# Patient Record
Sex: MALE | Race: WHITE | NOT HISPANIC OR LATINO | Employment: FULL TIME | URBAN - METROPOLITAN AREA
[De-identification: names, ages, dates, MRNs, and addresses within clinical notes are randomized per-mention and may not be internally consistent; named-entity substitution may affect disease eponyms.]

---

## 2019-07-08 NOTE — PROGRESS NOTES
Subjective:       Patient ID: Jaret Triana Jr. is a 29 y.o. male with no significant PMH who presents today to establish care.  He brews beer for a living.    Chief Complaint: Establish Care; GI Problem (nausea every 6-8 mos); and Shoulder Pain (LT side mild pain; clicking)    HPI   Patient with complaints of recurrent nausea every 6-8 months for the past 4-5 years.  Some abdominal pain in epigastrium and emesis.  Always after a meal.  Went to Urgent Care in the past.  Unclear of food association - possibly fatty.  Drank heavily in the past.  He quit smoking a couple a months ago and no drinking for a month.  He had an episode this past weekend.  He just got health insurance after a long.     Having left shoulder pain, but mild.  Feels a clicking in the shoulder.  No paresthesia.    Patient denies f/c, n/v/d.  No chest pain or SOB.  No dysuria.  No headaches or change in vision.  No dizziness.  No significant  weight gain or weight loss.  Remaining ROS negative.    Review of Systems   Constitutional: Negative for appetite change, diaphoresis, fatigue and unexpected weight change.   HENT: Negative for congestion, ear pain, hearing loss, rhinorrhea, sinus pressure, sore throat, trouble swallowing and voice change.    Eyes: Negative for photophobia, pain and visual disturbance.   Respiratory: Negative for cough, chest tightness, shortness of breath and wheezing.    Cardiovascular: Negative for chest pain, palpitations and leg swelling.   Gastrointestinal: Positive for abdominal pain, nausea and vomiting. Negative for blood in stool, constipation and diarrhea.   Endocrine: Negative for cold intolerance, heat intolerance, polydipsia, polyphagia and polyuria.   Genitourinary: Negative for decreased urine volume, difficulty urinating, discharge, dysuria, flank pain, hematuria, scrotal swelling, testicular pain and urgency.   Musculoskeletal: Positive for arthralgias (left shoulder). Negative for back pain, myalgias and  neck pain.   Skin: Negative for rash.   Neurological: Negative for dizziness, tremors, syncope, weakness, numbness and headaches.   Hematological: Does not bruise/bleed easily.   Psychiatric/Behavioral: Negative for agitation, confusion and sleep disturbance. The patient is not nervous/anxious.        Objective:      Physical Exam   Constitutional: He is oriented to person, place, and time. He appears well-developed and well-nourished.   HENT:   Head: Normocephalic.   Nose: Nose normal.   Mouth/Throat: Oropharynx is clear and moist.   Eyes: Pupils are equal, round, and reactive to light. Conjunctivae and EOM are normal. Right eye exhibits no discharge. Left eye exhibits no discharge. No scleral icterus.   Neck: Normal range of motion. Neck supple. No thyromegaly present.   Cardiovascular: Normal rate, regular rhythm, normal heart sounds and intact distal pulses. Exam reveals no gallop and no friction rub.   No murmur heard.  Pulmonary/Chest: Effort normal and breath sounds normal. No respiratory distress. He has no wheezes. He has no rales.   Abdominal: Soft. Bowel sounds are normal. He exhibits no distension. There is tenderness (mild epigastric tenderness). There is no rebound and no guarding.   Musculoskeletal: Normal range of motion. He exhibits no edema, tenderness or deformity.   Lymphadenopathy:     He has no cervical adenopathy.   Neurological: He is alert and oriented to person, place, and time. No cranial nerve deficit or sensory deficit.   Skin: Skin is warm and dry. No rash noted. No erythema.   Psychiatric: He has a normal mood and affect. His behavior is normal. Thought content normal.       Assessment:       1. Annual physical exam    2. Screen for STD (sexually transmitted disease)    3. Nausea in adult    4. Chronic left shoulder pain    5. Epigastric pain    6. Need for Tdap vaccination        Plan:   -Today's Visit - patient is awake and alert.    -GI - Patient with complaints of recurrent nausea  every 6-8 months for the past 4-5 years.  Some abdominal pain in epigastrium and emesis.  Always after a meal.  Went to Urgent Care in the past.  Unclear of food association - possibly fatty.  Drank heavily in the past.  He quit smoking a couple a months ago and no drinking for a month.  He had an episode this past weekend.  He just got health insurance after a long time.    Will check CMP, CBC, Amylase, Lipase.  Will check Abdominal US.  Will check Stool H pylori.  Will give a trial of Protonix 20mg once a day.    -MSK - Left Shoulder Pain and Clicking - Having left shoulder pain, but mild.  Feels a clicking in the shoulder.  No paresthesia.  For past 5 years.  Will refer to Ortho.    - -  We discussed STD screening - will do today.    -HCM - Discussed Flu and Tdap (today) Vaccines.      -Follow Up - 1 month

## 2019-07-09 ENCOUNTER — OFFICE VISIT (OUTPATIENT)
Dept: PRIMARY CARE CLINIC | Facility: CLINIC | Age: 29
End: 2019-07-09
Payer: COMMERCIAL

## 2019-07-09 VITALS
WEIGHT: 143.06 LBS | HEART RATE: 81 BPM | HEIGHT: 68 IN | SYSTOLIC BLOOD PRESSURE: 110 MMHG | DIASTOLIC BLOOD PRESSURE: 60 MMHG | BODY MASS INDEX: 21.68 KG/M2

## 2019-07-09 DIAGNOSIS — Z11.3 SCREEN FOR STD (SEXUALLY TRANSMITTED DISEASE): ICD-10-CM

## 2019-07-09 DIAGNOSIS — R11.0 NAUSEA IN ADULT: ICD-10-CM

## 2019-07-09 DIAGNOSIS — Z00.00 ANNUAL PHYSICAL EXAM: Primary | ICD-10-CM

## 2019-07-09 DIAGNOSIS — R10.13 EPIGASTRIC PAIN: ICD-10-CM

## 2019-07-09 DIAGNOSIS — M25.512 CHRONIC LEFT SHOULDER PAIN: ICD-10-CM

## 2019-07-09 DIAGNOSIS — G89.29 CHRONIC LEFT SHOULDER PAIN: ICD-10-CM

## 2019-07-09 DIAGNOSIS — Z23 NEED FOR TDAP VACCINATION: ICD-10-CM

## 2019-07-09 PROCEDURE — 3008F BODY MASS INDEX DOCD: CPT | Mod: CPTII,S$GLB,, | Performed by: INTERNAL MEDICINE

## 2019-07-09 PROCEDURE — 99999 PR PBB SHADOW E&M-NEW PATIENT-LVL IV: ICD-10-PCS | Mod: PBBFAC,,, | Performed by: INTERNAL MEDICINE

## 2019-07-09 PROCEDURE — 99203 PR OFFICE/OUTPT VISIT, NEW, LEVL III, 30-44 MIN: ICD-10-PCS | Mod: 25,S$GLB,, | Performed by: INTERNAL MEDICINE

## 2019-07-09 PROCEDURE — 99999 PR PBB SHADOW E&M-NEW PATIENT-LVL IV: CPT | Mod: PBBFAC,,, | Performed by: INTERNAL MEDICINE

## 2019-07-09 PROCEDURE — 90471 TDAP VACCINE GREATER THAN OR EQUAL TO 7YO IM: ICD-10-PCS | Mod: S$GLB,,, | Performed by: INTERNAL MEDICINE

## 2019-07-09 PROCEDURE — 99203 OFFICE O/P NEW LOW 30 MIN: CPT | Mod: 25,S$GLB,, | Performed by: INTERNAL MEDICINE

## 2019-07-09 PROCEDURE — 3008F PR BODY MASS INDEX (BMI) DOCUMENTED: ICD-10-PCS | Mod: CPTII,S$GLB,, | Performed by: INTERNAL MEDICINE

## 2019-07-09 PROCEDURE — 90715 TDAP VACCINE GREATER THAN OR EQUAL TO 7YO IM: ICD-10-PCS | Mod: S$GLB,,, | Performed by: INTERNAL MEDICINE

## 2019-07-09 PROCEDURE — 90471 IMMUNIZATION ADMIN: CPT | Mod: S$GLB,,, | Performed by: INTERNAL MEDICINE

## 2019-07-09 PROCEDURE — 90715 TDAP VACCINE 7 YRS/> IM: CPT | Mod: S$GLB,,, | Performed by: INTERNAL MEDICINE

## 2019-07-09 RX ORDER — PANTOPRAZOLE SODIUM 20 MG/1
20 TABLET, DELAYED RELEASE ORAL DAILY
Qty: 30 TABLET | Refills: 3 | Status: SHIPPED | OUTPATIENT
Start: 2019-07-09 | End: 2020-07-08

## 2019-07-09 NOTE — LETTER
July 9, 2019      [facility]  [address] [place] [address]  Phone: [phone]  Fax: [phone]       Patient: Jaret Triana   YOB: 1990  Date of Visit: 07/09/2019    To Whom It May Concern:    Raine Triana  was at [facility] on 07/09/2019. He may return to work on 07/10/19 with no restrictions. If you have any questions or concerns, or if I can be of further assistance, please do not hesitate to contact me.    Sincerely,    Dr. Woodard

## 2019-07-09 NOTE — LETTER
July 9, 2019      [facility]  [address] [facility] [address]  Phone: [phone]  Fax: [phone]       Patient: Jaret Triana   YOB: 1990  Date of Visit: 07/09/2019    To Whom It May Concern:    Raine Triana  was at [facility] on 07/09/2019. He may return to work on 07/09/19 with no restrictions. If you have any questions or concerns, or if I can be of further assistance, please do not hesitate to contact me.    Sincerely,    Dr. Woodard

## 2019-07-09 NOTE — PROGRESS NOTES
"Patient was given vaccine information sheet for the Tdap immunization. The area of injection was palpated using the acromion process as a landmark. This area was cleaned with alcohol. Using a 25g 1" safety needle, 0.5mL of the vaccine was placed into the right deltoid muscle. The injection site was dressed with a bandage. Patient experienced no complications and was discharged in stable condition. Tdap Lot: [id number] Exp: 03/14/2021    "

## 2019-07-10 ENCOUNTER — TELEPHONE (OUTPATIENT)
Dept: PRIMARY CARE CLINIC | Facility: CLINIC | Age: 29
End: 2019-07-10

## 2019-07-12 ENCOUNTER — HOSPITAL ENCOUNTER (OUTPATIENT)
Dept: RADIOLOGY | Facility: OTHER | Age: 29
Discharge: HOME | End: 2019-07-12
Attending: INTERNAL MEDICINE
Payer: COMMERCIAL

## 2019-07-12 ENCOUNTER — PATIENT MESSAGE (OUTPATIENT)
Dept: PRIMARY CARE CLINIC | Facility: CLINIC | Age: 29
End: 2019-07-12

## 2019-07-12 DIAGNOSIS — R11.0 NAUSEA IN ADULT: ICD-10-CM

## 2019-07-12 DIAGNOSIS — Z00.00 ANNUAL PHYSICAL EXAM: ICD-10-CM

## 2019-07-12 DIAGNOSIS — G89.29 CHRONIC LEFT SHOULDER PAIN: ICD-10-CM

## 2019-07-12 DIAGNOSIS — R10.13 EPIGASTRIC PAIN: ICD-10-CM

## 2019-07-12 DIAGNOSIS — M25.512 CHRONIC LEFT SHOULDER PAIN: ICD-10-CM

## 2019-07-12 DIAGNOSIS — Z11.3 SCREEN FOR STD (SEXUALLY TRANSMITTED DISEASE): ICD-10-CM

## 2019-07-12 PROCEDURE — 76700 US EXAM ABDOM COMPLETE: CPT | Mod: 26,,, | Performed by: RADIOLOGY

## 2019-07-12 PROCEDURE — 76700 US EXAM ABDOM COMPLETE: CPT | Mod: TC

## 2019-07-12 PROCEDURE — 76700 US ABDOMEN COMPLETE: ICD-10-PCS | Mod: 26,,, | Performed by: RADIOLOGY

## 2019-07-17 ENCOUNTER — PATIENT MESSAGE (OUTPATIENT)
Dept: PRIMARY CARE CLINIC | Facility: CLINIC | Age: 29
End: 2019-07-17

## 2019-07-21 ENCOUNTER — PATIENT MESSAGE (OUTPATIENT)
Dept: PRIMARY CARE CLINIC | Facility: CLINIC | Age: 29
End: 2019-07-21

## 2019-07-26 ENCOUNTER — TELEPHONE (OUTPATIENT)
Dept: ORTHOPEDICS | Facility: CLINIC | Age: 29
End: 2019-07-26

## 2019-07-26 DIAGNOSIS — R52 PAIN: Primary | ICD-10-CM

## 2019-07-30 ENCOUNTER — OFFICE VISIT (OUTPATIENT)
Dept: ORTHOPEDICS | Facility: CLINIC | Age: 29
End: 2019-07-30
Payer: COMMERCIAL

## 2019-07-30 ENCOUNTER — HOSPITAL ENCOUNTER (OUTPATIENT)
Dept: RADIOLOGY | Facility: OTHER | Age: 29
Discharge: HOME | End: 2019-07-30
Attending: ORTHOPAEDIC SURGERY
Payer: COMMERCIAL

## 2019-07-30 VITALS
BODY MASS INDEX: 21.76 KG/M2 | HEART RATE: 88 BPM | HEIGHT: 68 IN | SYSTOLIC BLOOD PRESSURE: 96 MMHG | DIASTOLIC BLOOD PRESSURE: 66 MMHG

## 2019-07-30 DIAGNOSIS — M75.52 BURSITIS OF LEFT SHOULDER: Primary | ICD-10-CM

## 2019-07-30 DIAGNOSIS — R52 PAIN: ICD-10-CM

## 2019-07-30 PROCEDURE — 99999 PR PBB SHADOW E&M-EST. PATIENT-LVL II: ICD-10-PCS | Mod: PBBFAC,,, | Performed by: ORTHOPAEDIC SURGERY

## 2019-07-30 PROCEDURE — 73030 X-RAY EXAM OF SHOULDER: CPT | Mod: TC,FY,LT

## 2019-07-30 PROCEDURE — 20610 LARGE JOINT ASPIRATION/INJECTION: L SUBACROMIAL BURSA: ICD-10-PCS | Mod: LT,S$GLB,, | Performed by: ORTHOPAEDIC SURGERY

## 2019-07-30 PROCEDURE — 99204 OFFICE O/P NEW MOD 45 MIN: CPT | Mod: 25,S$GLB,, | Performed by: ORTHOPAEDIC SURGERY

## 2019-07-30 PROCEDURE — 20610 DRAIN/INJ JOINT/BURSA W/O US: CPT | Mod: LT,S$GLB,, | Performed by: ORTHOPAEDIC SURGERY

## 2019-07-30 PROCEDURE — 3008F BODY MASS INDEX DOCD: CPT | Mod: CPTII,S$GLB,, | Performed by: ORTHOPAEDIC SURGERY

## 2019-07-30 PROCEDURE — 3008F PR BODY MASS INDEX (BMI) DOCUMENTED: ICD-10-PCS | Mod: CPTII,S$GLB,, | Performed by: ORTHOPAEDIC SURGERY

## 2019-07-30 PROCEDURE — 99204 PR OFFICE/OUTPT VISIT, NEW, LEVL IV, 45-59 MIN: ICD-10-PCS | Mod: 25,S$GLB,, | Performed by: ORTHOPAEDIC SURGERY

## 2019-07-30 PROCEDURE — 99999 PR PBB SHADOW E&M-EST. PATIENT-LVL II: CPT | Mod: PBBFAC,,, | Performed by: ORTHOPAEDIC SURGERY

## 2019-07-30 PROCEDURE — 73030 XR SHOULDER COMPLETE 2 OR MORE VIEWS LEFT: ICD-10-PCS | Mod: 26,LT,, | Performed by: RADIOLOGY

## 2019-07-30 PROCEDURE — 73030 X-RAY EXAM OF SHOULDER: CPT | Mod: 26,LT,, | Performed by: RADIOLOGY

## 2019-07-30 NOTE — LETTER
July 31, 2019      Taco Woodard MD  [id number]   [address]  [facility] [address]           [facility] [place] [address] [facility] [address]  [address], [address]  [facility] [address]  Phone: [phone]          Patient: Jaret Triana Jr.   MR Number: [id number]   YOB: 1990   Date of Visit: 7/30/2019       Dear Dr. Taco Woodard:    Thank you for referring Jaret Triana to me for evaluation. Attached you will find relevant portions of my assessment and plan of care.    If you have questions, please do not hesitate to call me. I look forward to following Jaret Triana along with you.    Sincerely,    Genoveva Zavala MD    Enclosure  CC:  No Recipients    If you would like to receive this communication electronically, please contact [email or web address] or [phone] to request more information on [employer] Link access.    For providers and/or their staff who would like to refer a patient to [facility], please contact us through our one-stop-shop provider referral line, [facility], at [phone].    If you feel you have received this communication in error or would no longer like to receive these types of communications, please e-mail [email or web address]

## 2019-07-30 NOTE — PROGRESS NOTES
"Subjective:      Patient ID: Jaret Triana Jr. is a 29 y.o. male.    Chief Complaint: left shoulder pain     HPI  Jaret Triana Jr. is a  29 y.o. male presenting today for left shoulder pain. He notes onset years ago. He does not recall injury. He does exercise and work at a [employer]. He has increased pain with use. Pain is mild. It occasionally bothers him at night. He notes occasional "clicking" in the shoulder. He has not tried anything for this. He denies numbness/tingling.       Review of patient's allergies indicates:  No Known Allergies      Current Outpatient Medications   Medication Sig Dispense Refill    pantoprazole (PROTONIX) 20 MG tablet Take 1 tablet (20 mg total) by mouth once daily. 30 tablet 3     No current facility-administered medications for this visit.        History reviewed. No pertinent past medical history.    Past Surgical History:   Procedure Laterality Date    CLOSED REDUCTION HAND FRACTURE      right hand - 2009    HAND SURGERY Right        Review of Systems:  Constitutional: Negative for chills and fever.   Respiratory: Negative for cough and shortness of breath.    Gastrointestinal: Negative for nausea and vomiting.   Skin: Negative for rash.   Neurological: Negative for dizziness and headaches.   Psychiatric/Behavioral: Negative for depression.   MSK as in HPI       OBJECTIVE:     PHYSICAL EXAM:  BP 96/66   Pulse 88   Ht 5' 8" (1.727 m)   BMI 21.76 kg/m²     GEN:  NAD, well-developed, well-groomed.  NEURO: Awake, alert, and oriented. Normal attention and concentration.    PSYCH: Normal mood and affect. Behavior is normal.  HEENT: No cervical lymphadenopathy noted.  CARDIOVASCULAR: Radial pulses 2+ bilaterally. No LE edema noted.  PULMONARY: Breath sounds normal. No respiratory distress.  SKIN: Intact, no rashes.      MSK:   LUE:  Good active ROM of the wrist and fingers. He has good motion of the shoulder. no significant ttp. AIN/PIN/Radial/Median/Ulnar Nerves assessed in " isolation without deficit. Radial & Ulnar arteries palpated 2+. Capillary Refill <3s.      RADIOGRAPHS:  Xray left shoulder pain 7/30/19  FINDINGS:  The skeletal structures are intact with good alignment.  The glenohumeral joint space is not well profiled, could be narrowed.  AC joint is unremarkable.  No erosion or abnormal soft tissue calcification is seen.  Comments: I have personally reviewed the imaging and I agree with the above radiologist's report.    ASSESSMENT/PLAN:       ICD-10-CM ICD-9-CM   1. Bursitis of left shoulder M75.52 726.10       Orders Placed This Encounter    Ambulatory Referral to Physical/Occupational Therapy     Plan:   -Treatment options discussed. He wishes to proceed with therapy and a left shoulder injection today.   -RTC 6 wks       I have explained the risks, benefits, and alternatives of the procedure in detail.  The patient voices understanding and all questions have been answered.  The patient agrees to proceed as planned. After a sterile prep of the skin in the normal the left shoulder is injected from the posterior approach using a 22 gauge needle with a combination of 8cc 1% lidocaine and 80 mg of kenalog. The patient is cautioned and immediate relief of pain is secondary to the local anesthetic and will be temporary.  After the anesthetic wears off there may be a increase in pain that may last for a few hours or a few days and they should use ice to help alleviate this flair up of pain.       The patient indicates understanding of these issues and agrees to the plan.    Heather Norris PA-C  [facility]   [facility]  [place], [place]

## 2019-07-31 RX ORDER — TRIAMCINOLONE ACETONIDE 40 MG/ML
80 INJECTION, SUSPENSION INTRA-ARTICULAR; INTRAMUSCULAR
Status: DISCONTINUED | OUTPATIENT
Start: 2019-07-31 | End: 2019-07-31 | Stop reason: HOSPADM

## 2019-07-31 RX ADMIN — TRIAMCINOLONE ACETONIDE 80 MG: 40 INJECTION, SUSPENSION INTRA-ARTICULAR; INTRAMUSCULAR at 09:07

## 2019-07-31 NOTE — PROGRESS NOTES
I have personally taken the history and examined the patient. I agree with the Hand Surgery PA's note. The plan will be shoulder injection, PT. Pt has TTP over biceps. Pain with ROM, ROM is full.

## 2019-07-31 NOTE — PROCEDURES
Large Joint Aspiration/Injection: L subacromial bursa  Date/Time: 7/31/2019 9:39 AM  Performed by: Genoveva Zavala MD  Authorized by: Genoveva Zavala MD     Consent Done?:  Yes (Verbal)  Indications:  Pain  Timeout: Prior to procedure the correct patient, procedure, and site was verified      Location:  Shoulder  Site:  L subacromial bursa  Prep: Patient was prepped and draped in usual sterile fashion    Needle size:  22 G  Approach:  Posterior  Medications:  80 mg triamcinolone acetonide 40 mg/mL

## 2019-08-03 ENCOUNTER — PATIENT MESSAGE (OUTPATIENT)
Dept: ORTHOPEDICS | Facility: CLINIC | Age: 29
End: 2019-08-03

## 2019-08-12 ENCOUNTER — CLINICAL SUPPORT (OUTPATIENT)
Dept: REHABILITATION | Facility: OTHER | Age: 29
End: 2019-08-12
Payer: COMMERCIAL

## 2019-08-12 DIAGNOSIS — G89.29 CHRONIC LEFT SHOULDER PAIN: ICD-10-CM

## 2019-08-12 DIAGNOSIS — R29.3 POSTURE IMBALANCE: ICD-10-CM

## 2019-08-12 DIAGNOSIS — M25.512 CHRONIC LEFT SHOULDER PAIN: ICD-10-CM

## 2019-08-12 PROCEDURE — 97161 PT EVAL LOW COMPLEX 20 MIN: CPT | Mod: PN | Performed by: PHYSICAL THERAPIST

## 2019-08-12 NOTE — PLAN OF CARE
[facility]  Physical Therapy Initial Evaluation    Name: Jaret Triana Jr.  Clinic Number: [id number]    Therapy Diagnosis:   Encounter Diagnoses   Name Primary?    Chronic left shoulder pain     Posture imbalance      Physician: Heather Norris PA-C    Physician Orders: PT Eval and Treat Left shoulder pain/ bursitis  2x/wk x  6 weeks  Modalities prn  Medical Diagnosis from Referral: M75.52 (ICD-10-CM) - Bursitis of left shoulder   Evaluation Date: 8/12/2019  Authorization Period Expiration: 12/31/2019  Plan of Care Expiration: 9/27/2019  Visit # / Visits authorized: 1/ 40    Time In: 8:05 AM  Time Out: 8:35 AM  Total Billable Time: 30 minutes    Precautions: Standard    Subjective   Date of onset: gradual onset over the past few years  History of current condition - Jaret reports: shoulder pain has been present for a while with some clicking and popping, particularly with overhead motions. Sometimes has pain at night with sleeping. Pt had injection at MD office recently, no significant relief. Pt is ambidextrous (writes with L, more R hand dominant)       No past medical history on file.  Jaret Triana Jr.  has a past surgical history that includes Hand surgery (Right) and Closed reduction hand fracture.    Jaret has a current medication list which includes the following prescription(s): pantoprazole.    Review of patient's allergies indicates:  No Known Allergies     Imaging, bone scan films: FINDINGS:  The skeletal structures are intact with good alignment.  The glenohumeral joint space is not well profiled, could be narrowed.  AC joint is unremarkable.  No erosion or abnormal soft tissue calcification is seen.     Prior Therapy: none  Social History: Pt lives alone. Goes to gym 3x/week performing weightlifting. Avoids overhead press due to pain.   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 Some difficulty with overhead lifting    Pain:  Current 0/10, worst 4/10, best 0/10   Location: L superior and anterior shoulder  Description: Aching  Aggravating Factors: lifting overhead, progression of day with work, sleeping on L side   Easing Factors: motrin, ice    Pts goals: be able to do a full work out at the gym without having to avoid exercise. Do all tasks at work without having to worry about pain    Objective     Observation: pt is alert and oriented, good historian    Posture: mildly rounded shoulders and forward head      Passive Range of Motion: slightly restricted in forward elevation due to lat and subscap tightness    Active Range of Motion: equal and WFL in all planes B      Upper Extremity Strength   (L) UE (R) UE   Shoulder flexion: 4+/5 5/5   Shoulder Abduction: 5/5 5/5   Shoulder extension 5/5 5/5   Shoulder ER 5/5 5/5   Shoulder IR 5/5 5/5   Lower Trap 4/5 4+/5   Middle Trap 4/5 4/5   Rhomboids 4/5 4/5   Elbow flexion: 5/5 5/5   Elbow extension: 5/5 5/5         Special Tests:  AC Joint Left Right   Empty Can Test - -   Hawkin's Silvestre - -   Neer's Test - -   Speed's test - -     Apley's Scratch Test   Left Right   Combined ER T2 T2   Combined IR T8 T8   Cross-body reach Posterior opposite shoulder Posterior opposite shoulder       Palpation: mild c/o TTP of L proximal biceps tendon    Sensation: grossly intact to light touch B UE    Flexibility: mild restrictions to lats and subscap (L>R)          CMS Impairment/Limitation/Restriction for FOTO Shoulder Survey    Therapist reviewed FOTO scores for Jaret Triana  on 8/12/2019.   FOTO documents entered into [employer] - see Media section.    Limitation Score: 31%  Category: Carrying    Current : CJ = at least 20% but < 40% impaired, limited or restricted  Goal: CI = at least 1% but < 20% impaired, limited or restricted  Discharge:n/a         TREATMENT   Treatment  "Time In: 8:30 AM  Treatment Time Out: 8:35 AM  Total Treatment time separate from Evaluation: 5 minutes    Jaret received therapeutic exercises to develop posture for 5 minutes including:  Reviewed and demonstrated chin tucks and scap squeezes with 5" hold for HEP      Home Exercises and Patient Education Provided    Education provided:   - Therapy rationale and plan of care    Written Home Exercises Provided: yes.  Exercises were reviewed and Jaret was able to demonstrate them prior to the end of the session.  Jaret demonstrated good  understanding of the education provided.     See EMR under Patient Instructions for exercises provided 8/12/2019.    Assessment   Jaret is a 29 y.o. male referred to outpatient Physical Therapy with a medical diagnosis of M75.52 (ICD-10-CM) - Bursitis of left shoulder. Pt presents with c/o chronic pain to anterior/superior L shoulder, worse with overhead lifting. Mild postural deficits noted in sitting and standing. Flexibility restrictions to lats and subscap (L>R) which restrict shoulder elevation and alter scapulohumeral rhythm. Mild weakness to periscapular mm with MMT.     Pt prognosis is Good.   Pt will benefit from skilled outpatient Physical Therapy to address the deficits stated above and in the chart below, provide pt/family education, and to maximize pt's level of independence.     Plan of care discussed with patient: Yes  Pt's spiritual, cultural and educational needs considered and patient is agreeable to the plan of care and goals as stated below:     Anticipated Barriers for therapy: none    Medical Necessity is demonstrated by the following  History  Co-morbidities and personal factors that may impact the plan of care Co-morbidities:   none    Personal Factors:   no deficits     low   Examination  Body Structures and Functions, activity limitations and participation restrictions that may impact the plan of care Body Regions:   upper extremities    Body Systems: "    gross symmetry  ROM  strength    Participation Restrictions:   Lifting, sleeping on L side, overhead activities    Activity limitations:   Learning and applying knowledge  no deficits    General Tasks and Commands  no deficits    Communication  no deficits    Mobility  lifting and carrying objects    Self care  no deficits    Domestic Life  no deficits    Interactions/Relationships  no deficits    Life Areas  no deficits    Community and Social Life  community life  recreation and leisure         low   Clinical Presentation evolving clinical presentation with changing clinical characteristics moderate   Decision Making/ Complexity Score: low     Goals:  Short Term Goals (4 Weeks):   1.  Pt to demonstrate improved flexibility by a half grade to allow improved ADLs with increased ease.   2. Pt will report <3/10 pain within the L shoulder for ease with lifting tasks at work.  3. Pt will report being independent with his HEP for maintenance of improvements gained during therapy sessions  4. Pt to demonstrate improved functional ability with FOTO limitation <=25% disability.    Long Term Goals (6 Weeks):   1. Pt to demonstrate improved ROM to WNL, R=L, to allow pt to perform self care with increased ease.  2. Pt to demonstrate improved flexibility by a full grade to allow improved postural alignment with increased ease.  3. Pt will demonstrate >=4+/5 strength within the L shoulder for ease with gym exercise.  4. Pt to demonstrate improved functional ability with FOTO limitation <=20% disability.  5. Pt independent with HEP and demonstrates good return technique.    Plan   Plan of care Certification: 8/12/2019 to 9/27/2019.    Outpatient Physical Therapy 2 times weekly for 6 weeks to include the following interventions: Iontophoresis (with Dexamethasone), Manual Therapy, Moist Heat/ Ice, Neuromuscular Re-ed, Patient Education, Therapeutic Exercise and Dry Needling.     Oksana Avila, PT

## 2019-08-22 ENCOUNTER — DOCUMENTATION ONLY (OUTPATIENT)
Dept: REHABILITATION | Facility: OTHER | Age: 29
End: 2019-08-22

## 2025-02-03 NOTE — PATIENT INSTRUCTIONS
A sheath was successfully inserted using micropuncture technique into the right femoral vein. Your exam was overall normal today.    Your blood pressure was good.    I will order routine fasting (at least 9 fasting) cholesterol and and STD screen today.    Get your Flu vaccine in the Fall.  We will give you the Tdap Vaccine today.    For your recurrent nausea and abdominal pain, I will order an ultrasound of your abdomen  And labs as above.  I would also like to check a stool test.  I will give you a trial of Protonix 20mg once a day for now.    For your left shoulder pain, I will refer you to Orthopedics for further assessment.    Return in 1 month - sooner if needed.  Please come at least 15-20 minutes before your scheduled appointment time.      Pantoprazole tablets (protonix)  What is this medicine?  PANTOPRAZOLE (pan TOE pra zole) prevents the production of acid in the stomach. It is used to treat gastroesophageal reflux disease (GERD), inflammation of the esophagus, and Zollinger-Degroot syndrome.  How should I use this medicine?  Take this medicine by mouth. Swallow the tablets whole with a drink of water. Follow the directions on the prescription label. Do not crush, break, or chew. Take your medicine at regular intervals. Do not take your medicine more often than directed.  Talk to your pediatrician regarding the use of this medicine in children. While this drug may be prescribed for children as young as 5 years for selected conditions, precautions do apply.  What side effects may I notice from receiving this medicine?  Side effects that you should report to your doctor or health care professional as soon as possible:  · allergic reactions like skin rash, itching or hives, swelling of the face, lips, or tongue  · bone, muscle or joint pain  · breathing problems  · chest pain or chest tightness  · dark yellow or brown urine  · dizziness  · fast, irregular heartbeat  · feeling faint or lightheaded  · fever or sore throat  · muscle spasm  · palpitations  · rash on cheeks or arms that gets worse in the  sun  · redness, blistering, peeling or loosening of the skin, including inside the mouth  · seizures  · tremors  · unusual bleeding or bruising  · unusually weak or tired  · yellowing of the eyes or skin  Side effects that usually do not require medical attention (Report these to your doctor or health care professional if they continue or are bothersome.):  · constipation  · diarrhea  · dry mouth  · headache  · nausea  What may interact with this medicine?  Do not take this medicine with any of the following medications:  · atazanavir  · nelfinavir  This medicine may also interact with the following medications:  · ampicillin  · delavirdine  · erlotinib  · iron salts  · medicines for fungal infections like ketoconazole, itraconazole and voriconazole  · methotrexate  · mycophenolate mofetil  · warfarin  What if I miss a dose?  If you miss a dose, take it as soon as you can. If it is almost time for your next dose, take only that dose. Do not take double or extra doses.  Where should I keep my medicine?  Keep out of the reach of children.  Store at room temperature between 15 and 30 degrees C (59 and 86 degrees F). Protect from light and moisture. Throw away any unused medicine after the expiration date.  What should I tell my health care provider before I take this medicine?  They need to know if you have any of these conditions:  · liver disease  · low levels of magnesium in the blood  · lupus  · an unusual or allergic reaction to omeprazole, lansoprazole, pantoprazole, rabeprazole, other medicines, foods, dyes, or preservatives  · pregnant or trying to get pregnant  · breast-feeding  What should I watch for while using this medicine?  It can take several days before your stomach pain gets better. Check with your doctor or health care professional if your condition does not start to get better, or if it gets worse.  You may need blood work done while you are taking this medicine.  NOTE:This sheet is a summary. It  may not cover all possible information. If you have questions about this medicine, talk to your doctor, pharmacist, or health care provider. Copyright© 2017 [employer]